# Patient Record
Sex: FEMALE | Race: WHITE | NOT HISPANIC OR LATINO | Employment: UNEMPLOYED | URBAN - NONMETROPOLITAN AREA
[De-identification: names, ages, dates, MRNs, and addresses within clinical notes are randomized per-mention and may not be internally consistent; named-entity substitution may affect disease eponyms.]

---

## 2021-04-27 ENCOUNTER — OFFICE VISIT (OUTPATIENT)
Dept: CARDIOLOGY | Facility: CLINIC | Age: 61
End: 2021-04-27

## 2021-04-27 VITALS
WEIGHT: 241 LBS | DIASTOLIC BLOOD PRESSURE: 90 MMHG | BODY MASS INDEX: 37.83 KG/M2 | TEMPERATURE: 98.2 F | OXYGEN SATURATION: 96 % | HEART RATE: 71 BPM | HEIGHT: 67 IN | SYSTOLIC BLOOD PRESSURE: 154 MMHG

## 2021-04-27 DIAGNOSIS — I95.89 OTHER SPECIFIED HYPOTENSION: ICD-10-CM

## 2021-04-27 DIAGNOSIS — R53.82 CHRONIC FATIGUE: ICD-10-CM

## 2021-04-27 DIAGNOSIS — E88.81 METABOLIC SYNDROME: ICD-10-CM

## 2021-04-27 DIAGNOSIS — M79.10 MYALGIA: ICD-10-CM

## 2021-04-27 DIAGNOSIS — R94.31 ABNORMAL EKG: ICD-10-CM

## 2021-04-27 DIAGNOSIS — R00.2 PALPITATIONS: ICD-10-CM

## 2021-04-27 DIAGNOSIS — R06.02 SHORTNESS OF BREATH: ICD-10-CM

## 2021-04-27 DIAGNOSIS — E55.9 VITAMIN D DEFICIENCY: ICD-10-CM

## 2021-04-27 DIAGNOSIS — U07.1 COVID-19 VIRUS INFECTION: Primary | ICD-10-CM

## 2021-04-27 DIAGNOSIS — E78.00 HYPERCHOLESTEREMIA: ICD-10-CM

## 2021-04-27 DIAGNOSIS — I25.6 SILENT MYOCARDIAL ISCHEMIA: ICD-10-CM

## 2021-04-27 PROBLEM — I95.9 ARTERIAL HYPOTENSION: Status: ACTIVE | Noted: 2021-04-27

## 2021-04-27 PROBLEM — E88.810 METABOLIC SYNDROME: Status: ACTIVE | Noted: 2021-04-27

## 2021-04-27 PROCEDURE — 99204 OFFICE O/P NEW MOD 45 MIN: CPT | Performed by: INTERNAL MEDICINE

## 2021-04-27 PROCEDURE — 93000 ELECTROCARDIOGRAM COMPLETE: CPT | Performed by: INTERNAL MEDICINE

## 2021-04-27 RX ORDER — ALBUTEROL SULFATE 90 UG/1
2 AEROSOL, METERED RESPIRATORY (INHALATION) EVERY 4 HOURS PRN
COMMUNITY

## 2021-04-27 RX ORDER — MIDODRINE HYDROCHLORIDE 10 MG/1
10 TABLET ORAL
COMMUNITY
End: 2021-04-27

## 2021-04-27 RX ORDER — BENZONATATE 100 MG/1
100 CAPSULE ORAL 3 TIMES DAILY PRN
COMMUNITY
End: 2021-07-27

## 2021-04-27 RX ORDER — ASPIRIN 81 MG/1
81 TABLET ORAL DAILY
COMMUNITY

## 2021-04-27 RX ORDER — LANOLIN ALCOHOL/MO/W.PET/CERES
400 CREAM (GRAM) TOPICAL DAILY
COMMUNITY

## 2021-04-27 RX ORDER — LORATADINE 10 MG/1
10 TABLET ORAL DAILY
COMMUNITY

## 2021-04-27 RX ORDER — ATORVASTATIN CALCIUM 80 MG/1
40 TABLET, FILM COATED ORAL DAILY
COMMUNITY
End: 2022-09-20 | Stop reason: SDUPTHER

## 2021-04-27 RX ORDER — MIDODRINE HYDROCHLORIDE 10 MG/1
10 TABLET ORAL
Status: SHIPPED | COMMUNITY
Start: 2021-04-27 | End: 2021-05-04 | Stop reason: DRUGHIGH

## 2021-04-27 NOTE — PATIENT INSTRUCTIONS
Mediterranean Diet  A Mediterranean diet refers to food and lifestyle choices that are based on the traditions of countries located on the [place]. This way of eating has been shown to help prevent certain conditions and improve outcomes for people who have chronic diseases, like kidney disease and heart disease.  What are tips for following this plan?  Lifestyle  · Cook and eat meals together with your family, when possible.  · Drink enough fluid to keep your urine clear or pale yellow.  · Be physically active every day. This includes:  ? Aerobic exercise like running or swimming.  ? Leisure activities like gardening, walking, or housework.  · Get 7-8 hours of sleep each night.  · If recommended by your health care provider, drink red wine in moderation. This means 1 glass a day for nonpregnant women and 2 glasses a day for men. A glass of wine equals 5 oz (150 mL).  Reading food labels    · Check the serving size of packaged foods. For foods such as rice and pasta, the serving size refers to the amount of cooked product, not dry.  · Check the total fat in packaged foods. Avoid foods that have saturated fat or trans fats.  · Check the ingredients list for added sugars, such as corn syrup.  Shopping  · At the grocery store, buy most of your food from the areas near the walls of the store. This includes:  ? Fresh fruits and vegetables (produce).  ? Grains, beans, nuts, and seeds. Some of these may be available in unpackaged forms or large amounts (in bulk).  ? Fresh seafood.  ? Poultry and eggs.  ? Low-fat dairy products.  · Buy whole ingredients instead of prepackaged foods.  · Buy fresh fruits and vegetables in-season from local farmers markets.  · Buy frozen fruits and vegetables in resealable bags.  · If you do not have access to quality fresh seafood, buy precooked frozen shrimp or canned fish, such as tuna, salmon, or sardines.  · Buy small amounts of raw or cooked vegetables, salads, or olives from  the deli or salad bar at your store.  · Stock your pantry so you always have certain foods on hand, such as olive oil, canned tuna, canned tomatoes, rice, pasta, and beans.  Cooking  · Cook foods with extra-virgin olive oil instead of using butter or other vegetable oils.  · Have meat as a side dish, and have vegetables or grains as your main dish. This means having meat in small portions or adding small amounts of meat to foods like pasta or stew.  · Use beans or vegetables instead of meat in common dishes like chili or lasagna.  · [place] with different cooking methods. Try roasting or broiling vegetables instead of steaming or sautéeing them.  · Add frozen vegetables to soups, stews, pasta, or rice.  · Add nuts or seeds for added healthy fat at each meal. You can add these to yogurt, salads, or vegetable dishes.  · Marinate fish or vegetables using olive oil, lemon juice, garlic, and fresh herbs.  Meal planning    · Plan to eat 1 vegetarian meal one day each week. Try to work up to 2 vegetarian meals, if possible.  · Eat seafood 2 or more times a week.  · Have healthy snacks readily available, such as:  ? Vegetable sticks with hummus.  ? Greek yogurt.  ? Fruit and nut trail mix.  · Eat balanced meals throughout the week. This includes:  ? Fruit: 2-3 servings a day  ? Vegetables: 4-5 servings a day  ? Low-fat dairy: 2 servings a day  ? Fish, poultry, or lean meat: 1 serving a day  ? Beans and legumes: 2 or more servings a week  ? Nuts and seeds: 1-2 servings a day  ? Whole grains: 6-8 servings a day  ? Extra-virgin olive oil: 3-4 servings a day  · Limit red meat and sweets to only a few servings a month  What are my food choices?  · Mediterranean diet  ? Recommended  § Grains: Whole-grain pasta. Brown rice. Bulgar wheat. Polenta. Couscous. Whole-wheat bread. Oatmeal. Quinoa.  § Vegetables: Artichokes. Beets. Broccoli. Cabbage. Carrots. Eggplant. Green beans. Chard. Kale. Spinach. Onions. Leeks. Peas. Squash.  Tomatoes. Peppers. Radishes.  § Fruits: Apples. Apricots. Avocado. Berries. Bananas. Cherries. Dates. Figs. Grapes. Judith. Melon. Oranges. Peaches. Plums. Pomegranate.  § Meats and other protein foods: Beans. Almonds. Sunflower seeds. Pine nuts. Peanuts. Cod. [place]. Scallops. Shrimp. Tuna. Tilapia. Clams. Oysters. Eggs.  § Dairy: Low-fat milk. Cheese. Greek yogurt.  § Beverages: Water. Red wine. Herbal tea.  § Fats and oils: Extra virgin olive oil. Avocado oil. Grape seed oil.  § Sweets and desserts: Greek yogurt with honey. Baked apples. Poached pears. Trail mix.  § Seasoning and other foods: Basil. Cilantro. Coriander. Cumin. Mint. Parsley. Geoff. Rosemary. Tarragon. Garlic. Oregano. Thyme. Pepper. Balsalmic vinegar. Tahini. Hummus. Tomato sauce. Olives. Mushrooms.  ? Limit these  § Grains: Prepackaged pasta or rice dishes. Prepackaged cereal with added sugar.  § Vegetables: Deep fried potatoes (french fries).  § Fruits: Fruit canned in syrup.  § Meats and other protein foods: Beef. Pork. Lamb. Poultry with skin. Hot dogs. [place]gu[place].  § Dairy: Ice cream. Sour cream. Whole milk.  § Beverages: Juice. Sugar-sweetened soft drinks. Beer. Liquor and spirits.  § Fats and oils: Butter. Canola oil. Vegetable oil. Beef fat (tallow). Lard.  § Sweets and desserts: Cookies. Cakes. Pies. Candy.  § Seasoning and other foods: Mayonnaise. Premade sauces and marinades.  The items listed may not be a complete list. Talk with your dietitian about what dietary choices are right for you.  Summary  · The Mediterranean diet includes both food and lifestyle choices.  · Eat a variety of fresh fruits and vegetables, beans, nuts, seeds, and whole grains.  · Limit the amount of red meat and sweets that you eat.  · Talk with your health care provider about whether it is safe for you to drink red wine in moderation. This means 1 glass a day for nonpregnant women and 2 glasses a day for men. A glass of wine equals 5 oz (150 mL).  This information  is not intended to replace advice given to you by your health care provider. Make sure you discuss any questions you have with your health care provider.  Document Revised: 08/17/2017 Document Reviewed: 08/10/2017  Elsevier Patient Education © 2020 [employer]

## 2021-04-27 NOTE — PROGRESS NOTES
Chief Complaint   Patient presents with   • Establish Care     PCP referred, cardiac workup post Covid   • Covid     diagnosed in January, admitted to [facility] in [place], transfered to [facility] in [place], respiratory failure,intubated twice etc.  Transfered back to [facility] for rehab.  Requesting records. NSTEMI listed on her discharge papers.    • Shortness of Breath     with exertion, currently wearing O2 24/7 post Covid, Dr Stock following.    • Palpitations     rarely will have a palpitation   • Hypotension     was discharged on Midodrine, had never had low BP before.        CARDIAC COMPLAINTS  dyspnea and fatigue      Subjective   Yulissa Couch is a 60 y.o. female came in today for initial cardiac evaluation.  She had no previously diagnosed cardiac or other medical history to January of this year.  She was diagnosed with COVID-19 infection and within couple of days she started having more shortness of breath got admitted to the hospital at [place].  She had significant desaturation and was transferred to [facility] where she was intubated.  She was on the ventilator for a prolonged period of time she was extubated and got re intubated again because of the desaturation.  She was treated with remdesivir, Decadron.  She also received convalescent plasma and later was treated with antibiotic in the form of azithromycin and vancomycin.  During her stay she also had mild elevation of the troponin suggestive of possible non-STEMI.  Her echocardiogram was reported as normal EF though it was technically limited.  Since she was discharged she has been having problem with hypotension.  She was started on ProAmatine and she is on 10 mg 3 times a day.  She also takes a very high dose of Lipitor.  She now has shortness of breath on any exertion.  She is using oxygen 24/7 and is now being followed by the pulmonologist she has some occasional palpitation.  She does feel tired and fatigue and unable to do much  activities.  She was in the rehab for few weeks.  She is trying to exercise on her own.  She apparently had labs done at your office recently.  She used to smoke in the past but did quit in .  Her father  with an MI and her mother  with stroke    Past Surgical History:   Procedure Laterality Date   • ECHO - CONVERTED  2021    @ [facility]- TLS. EF 67%. Mild MR   • ECHO - CONVERTED  2021    @ [facility]. TLS. EF 66^. Trace MR       Current Outpatient Medications   Medication Sig Dispense Refill   • albuterol sulfate  (90 Base) MCG/ACT inhaler Inhale 2 puffs Every 4 (Four) Hours As Needed for Wheezing.     • aspirin 81 MG EC tablet Take 81 mg by mouth Daily.     • atorvastatin (LIPITOR) 80 MG tablet Take 80 mg by mouth Daily.     • benzonatate (TESSALON) 100 MG capsule Take 100 mg by mouth 3 (Three) Times a Day As Needed for Cough.     • folic acid (FOLVITE) 400 MCG tablet Take 400 mcg by mouth Daily.     • loratadine (CLARITIN) 10 MG tablet Take 10 mg by mouth Daily.     • midodrine (PROAMATINE) 10 MG tablet Take 1 tablet by mouth 3 (Three) Times a Day Before Meals. Reduce to 1/2 tab TID     • O2 (OXYGEN) Inhale 2 L/min 1 (One) Time.     • vitamin B-12 (CYANOCOBALAMIN) 250 MCG tablet Take 250 mcg by mouth Daily.       No current facility-administered medications for this visit.           ALLERGIES:  Morphine, Penicillins, and Benadryl [diphenhydramine]    Past Medical History:   Diagnosis Date   • Anemia    • Asthma    • History of hysterectomy    • Hyperlipidemia    • Hypotension    • Seasonal allergies        Social History     Tobacco Use   Smoking Status Former Smoker   • Packs/day: 1.00   • Years: 30.00   • Pack years: 30.00   • Types: Cigarettes   • Quit date:    • Years since quitting: 10.3   Smokeless Tobacco Never Used          Family History   Problem Relation Age of Onset   • Stroke Mother          at 49 with stroke   • Heart attack Father          at 52 with MI   • Stroke  "Maternal Grandfather    • Other Other         5 siblings, all have HTN   • No Known Problems Son    • No Known Problems Son    • Diabetes Son    • Hypertension Son        Review of Systems   Constitutional: Positive for malaise/fatigue and weight gain. Negative for decreased appetite.   HENT: Negative for congestion and sore throat.    Eyes: Negative for blurred vision.   Cardiovascular: Positive for dyspnea on exertion. Negative for chest pain.   Respiratory: Positive for shortness of breath. Negative for snoring.    Endocrine: Negative for cold intolerance and heat intolerance.   Hematologic/Lymphatic: Negative for adenopathy. Does not bruise/bleed easily.   Skin: Negative for itching, nail changes and skin cancer.   Musculoskeletal: Negative for arthritis and myalgias.   Gastrointestinal: Negative for abdominal pain, dysphagia and heartburn.   Genitourinary: Negative for bladder incontinence and frequency.   Neurological: Negative for dizziness, light-headedness, seizures and vertigo.   Psychiatric/Behavioral: Negative for altered mental status.   Allergic/Immunologic: Negative for environmental allergies and hives.       Diabetes- No  Thyroid- normal    Objective     /90 (BP Location: Left arm)   Pulse 71   Temp 98.2 °F (36.8 °C)   Ht 170.2 cm (67\")   Wt 109 kg (241 lb)   SpO2 96%   BMI 37.75 kg/m²     Vitals and nursing note reviewed.   Constitutional:       Appearance: Healthy appearance. Not in distress.   Eyes:      Conjunctiva/sclera: Conjunctivae normal.      Pupils: Pupils are equal, round, and reactive to light.   HENT:      Head: Normocephalic.   Pulmonary:      Effort: Pulmonary effort is normal.      Breath sounds: Normal breath sounds.   Cardiovascular:      Normal rate. Regular rhythm.      Murmurs: There is a systolic murmur.   Edema:     Ankle: bilateral 1+ edema of the ankle.     Feet: bilateral 1+ edema of the feet.  Abdominal:      General: Bowel sounds are normal.      Palpations: " Abdomen is soft.   Musculoskeletal: Normal range of motion.      Cervical back: Normal range of motion and neck supple. Skin:     General: Skin is warm and dry.   Neurological:      Mental Status: Alert, oriented to person, place, and time and oriented to person, place and time.           ECG 12 Lead    Date/Time: 4/27/2021 4:28 PM  Performed by: Darvin Cantu MD  Authorized by: Darvin Cantu MD   Comparison: compared with previous ECG from 2/2/2021  Comparison to previous ECG: New Q Wawe  Rhythm: sinus rhythm  Rate: normal  Q waves: V3 and V4    QRS axis: normal    Clinical impression: abnormal EKG              Assessment/Plan   Patient's Body mass index is 37.75 kg/m². BMI is above normal parameters. Recommendations include: educational material, exercise counseling and nutrition counseling.     Diagnoses and all orders for this visit:    1. COVID-19 virus infection (Primary)  -     Adult Transthoracic Echo Complete W/ Cont if Necessary Per Protocol; Future  -     SARS-CoV-2 Antibodies (Roche); Future    2. Other specified hypotension  -     CBC & Differential; Future    3. Metabolic syndrome  -     Comprehensive Metabolic Panel; Future    4. Shortness of breath  -     Adult Transthoracic Echo Complete W/ Cont if Necessary Per Protocol; Future    5. Palpitations    6. Hypercholesteremia  -     Lipid Panel; Future    7. Myalgia  -     CK; Future    8. Abnormal EKG  -     Stress Test With Myocardial Perfusion One Day; Future    9. Silent myocardial ischemia  -     Stress Test With Myocardial Perfusion One Day; Future    10. Vitamin D deficiency  -     Vitamin D 25 Hydroxy; Future    11. Chronic fatigue  -     Folate; Future  -     Vitamin B12; Future       At baseline her heart rate is normal.  Her blood pressure is mildly elevated.  Her EKG showed normal sinus rhythm with Q waves in the anterior leads.  Her clinical examination reveals a BMI of 38.  She does have short systolic murmur and she has  trace pedal edema.  She is on 2 L oxygen and her O2 sat was 96%    Her main problems is the shortness of breath which occurs on exertion.  Her CT scan does show persistent infiltrate.  Some of the problems could be related to her lungs itself.  I did schedule her to undergo an echocardiogram to reevaluate the LV function and also to evaluate the PA pressure.  She also wants to know whether she needs to take a Covid vaccine.  I advised her to check the antibodies and if the antibodies are elevated then I do not think she needs it now    Regarding her mild elevation of cardiac enzymes when she was in the hospital, it could be related to her hypoxia and demand ischemia.  Since his shortness of breath is still persisting, I schedule her to undergo a Lexiscan to rule out silent ischemia    Regarding the hypercholesterolemia, I do not have the lipids value.  She is now having a lot of myalgia.  I advised her to recheck her lipids along with it CK level.  If the lipase is completely normal then we can reduce the dose of Lipitor to 10 mg    She also seems to be having some low vitamin.  I advised her to check her B12, folic acid and vitamin D level    She has gained over 15 pounds, part of them could be related to the steroid use and also not exercising.  I talked to her about diet and weight reduction.  I gave her papers on Mediterranean diet    Regarding the hypotension, at this time we can cut down the dose from 10 mg to 5 mg.  Recheck her blood pressure in 1 week if it is normal then we can cut it down to 2.53 times a day and slowly try to taper her off ProAmatine.    Based on your response, further recommendations will be made.           Electronically signed by Darvin Cantu MD April 27, 2021 16:17 EDT

## 2021-04-29 ENCOUNTER — LAB (OUTPATIENT)
Dept: LAB | Facility: HOSPITAL | Age: 61
End: 2021-04-29

## 2021-04-29 DIAGNOSIS — I95.89 OTHER SPECIFIED HYPOTENSION: ICD-10-CM

## 2021-04-29 DIAGNOSIS — M79.10 MYALGIA: ICD-10-CM

## 2021-04-29 DIAGNOSIS — E88.81 METABOLIC SYNDROME: ICD-10-CM

## 2021-04-29 DIAGNOSIS — R53.82 CHRONIC FATIGUE: ICD-10-CM

## 2021-04-29 DIAGNOSIS — E55.9 VITAMIN D DEFICIENCY: ICD-10-CM

## 2021-04-29 DIAGNOSIS — E78.00 HYPERCHOLESTEREMIA: ICD-10-CM

## 2021-04-29 LAB
ALBUMIN SERPL-MCNC: 3.94 G/DL (ref 3.5–5.2)
ALBUMIN/GLOB SERPL: 1.2 G/DL
ALP SERPL-CCNC: 97 U/L (ref 39–117)
ALT SERPL W P-5'-P-CCNC: 18 U/L (ref 1–33)
ANION GAP SERPL CALCULATED.3IONS-SCNC: 11.9 MMOL/L (ref 5–15)
AST SERPL-CCNC: 20 U/L (ref 1–32)
BASOPHILS # BLD AUTO: 0.04 10*3/MM3 (ref 0–0.2)
BASOPHILS NFR BLD AUTO: 0.8 % (ref 0–1.5)
BILIRUB SERPL-MCNC: 0.3 MG/DL (ref 0–1.2)
BUN SERPL-MCNC: 10 MG/DL (ref 8–23)
BUN/CREAT SERPL: 13.3 (ref 7–25)
CALCIUM SPEC-SCNC: 9.6 MG/DL (ref 8.6–10.5)
CHLORIDE SERPL-SCNC: 107 MMOL/L (ref 98–107)
CHOLEST SERPL-MCNC: 176 MG/DL (ref 0–200)
CK SERPL-CCNC: 26 U/L (ref 20–180)
CO2 SERPL-SCNC: 23.1 MMOL/L (ref 22–29)
CREAT SERPL-MCNC: 0.75 MG/DL (ref 0.57–1)
DEPRECATED RDW RBC AUTO: 47.7 FL (ref 37–54)
EOSINOPHIL # BLD AUTO: 0.31 10*3/MM3 (ref 0–0.4)
EOSINOPHIL NFR BLD AUTO: 5.9 % (ref 0.3–6.2)
ERYTHROCYTE [DISTWIDTH] IN BLOOD BY AUTOMATED COUNT: 13.4 % (ref 12.3–15.4)
GFR SERPL CREATININE-BSD FRML MDRD: 78 ML/MIN/1.73
GLOBULIN UR ELPH-MCNC: 3.2 GM/DL
GLUCOSE SERPL-MCNC: 94 MG/DL (ref 65–99)
HCT VFR BLD AUTO: 37 % (ref 34–46.6)
HDLC SERPL-MCNC: 49 MG/DL (ref 40–60)
HGB BLD-MCNC: 10.9 G/DL (ref 12–15.9)
IMM GRANULOCYTES # BLD AUTO: 0.02 10*3/MM3 (ref 0–0.05)
IMM GRANULOCYTES NFR BLD AUTO: 0.4 % (ref 0–0.5)
LDLC SERPL CALC-MCNC: 93 MG/DL (ref 0–100)
LDLC/HDLC SERPL: 1.78 {RATIO}
LYMPHOCYTES # BLD AUTO: 1.77 10*3/MM3 (ref 0.7–3.1)
LYMPHOCYTES NFR BLD AUTO: 33.5 % (ref 19.6–45.3)
MCH RBC QN AUTO: 28.2 PG (ref 26.6–33)
MCHC RBC AUTO-ENTMCNC: 29.5 G/DL (ref 31.5–35.7)
MCV RBC AUTO: 95.9 FL (ref 79–97)
MONOCYTES # BLD AUTO: 0.5 10*3/MM3 (ref 0.1–0.9)
MONOCYTES NFR BLD AUTO: 9.5 % (ref 5–12)
NEUTROPHILS NFR BLD AUTO: 2.64 10*3/MM3 (ref 1.7–7)
NEUTROPHILS NFR BLD AUTO: 49.9 % (ref 42.7–76)
NRBC BLD AUTO-RTO: 0 /100 WBC (ref 0–0.2)
PLATELET # BLD AUTO: 230 10*3/MM3 (ref 140–450)
PMV BLD AUTO: 10.9 FL (ref 6–12)
POTASSIUM SERPL-SCNC: 4.5 MMOL/L (ref 3.5–5.2)
PROT SERPL-MCNC: 7.1 G/DL (ref 6–8.5)
RBC # BLD AUTO: 3.86 10*6/MM3 (ref 3.77–5.28)
SODIUM SERPL-SCNC: 142 MMOL/L (ref 136–145)
TRIGL SERPL-MCNC: 200 MG/DL (ref 0–150)
VLDLC SERPL-MCNC: 34 MG/DL (ref 5–40)
WBC # BLD AUTO: 5.28 10*3/MM3 (ref 3.4–10.8)

## 2021-04-29 PROCEDURE — 86769 SARS-COV-2 COVID-19 ANTIBODY: CPT | Performed by: INTERNAL MEDICINE

## 2021-04-29 PROCEDURE — 82306 VITAMIN D 25 HYDROXY: CPT

## 2021-04-29 PROCEDURE — 82746 ASSAY OF FOLIC ACID SERUM: CPT

## 2021-04-29 PROCEDURE — 80053 COMPREHEN METABOLIC PANEL: CPT

## 2021-04-29 PROCEDURE — 80061 LIPID PANEL: CPT

## 2021-04-29 PROCEDURE — 82550 ASSAY OF CK (CPK): CPT

## 2021-04-29 PROCEDURE — 85025 COMPLETE CBC W/AUTO DIFF WBC: CPT

## 2021-04-29 PROCEDURE — 82607 VITAMIN B-12: CPT

## 2021-04-30 LAB
25(OH)D3+25(OH)D2 SERPL-MCNC: 8.1 NG/ML (ref 30–100)
FOLATE SERPL-MCNC: >20 NG/ML
VIT B12 SERPL-MCNC: 458 PG/ML (ref 232–1245)

## 2021-05-04 ENCOUNTER — CLINICAL SUPPORT (OUTPATIENT)
Dept: CARDIOLOGY | Facility: CLINIC | Age: 61
End: 2021-05-04

## 2021-05-04 VITALS — SYSTOLIC BLOOD PRESSURE: 132 MMHG | DIASTOLIC BLOOD PRESSURE: 74 MMHG | HEART RATE: 64 BPM

## 2021-05-04 DIAGNOSIS — Z01.30 BLOOD PRESSURE CHECK: Primary | ICD-10-CM

## 2021-05-04 RX ORDER — ERGOCALCIFEROL 1.25 MG/1
50000 CAPSULE ORAL WEEKLY
Qty: 5 CAPSULE | Refills: 11 | Status: SHIPPED | OUTPATIENT
Start: 2021-05-04 | End: 2022-05-29 | Stop reason: SDUPTHER

## 2021-05-04 RX ORDER — MIDODRINE HYDROCHLORIDE 2.5 MG/1
2.5 TABLET ORAL
Qty: 90 TABLET | Refills: 5 | Status: SHIPPED | OUTPATIENT
Start: 2021-05-04 | End: 2022-02-09

## 2021-05-04 NOTE — TELEPHONE ENCOUNTER
Patient was made aware to decrease midodrine to 2.5 mg TID. Script pended to be sent to [facility] in [place].

## 2021-05-04 NOTE — TELEPHONE ENCOUNTER
Patient came by office this morning for BP check after decreasing midodrine to 5 mg TID. Do you want to decrease dose?    BP - 132/74 right arm  HR - 64

## 2021-05-24 ENCOUNTER — HOSPITAL ENCOUNTER (OUTPATIENT)
Dept: CARDIOLOGY | Facility: HOSPITAL | Age: 61
Discharge: HOME | End: 2021-05-24

## 2021-05-24 DIAGNOSIS — R06.02 SHORTNESS OF BREATH: ICD-10-CM

## 2021-05-24 DIAGNOSIS — I25.6 SILENT MYOCARDIAL ISCHEMIA: ICD-10-CM

## 2021-05-24 DIAGNOSIS — U07.1 COVID-19 VIRUS INFECTION: ICD-10-CM

## 2021-05-24 DIAGNOSIS — R94.31 ABNORMAL EKG: ICD-10-CM

## 2021-05-24 LAB
BH CV ECHO MEAS - ACS: 1.6 CM
BH CV ECHO MEAS - AO MAX PG (FULL): 3.3 MMHG
BH CV ECHO MEAS - AO MAX PG: 8.2 MMHG
BH CV ECHO MEAS - AO MEAN PG (FULL): 1.9 MMHG
BH CV ECHO MEAS - AO MEAN PG: 5 MMHG
BH CV ECHO MEAS - AO ROOT AREA (BSA CORRECTED): 1.3
BH CV ECHO MEAS - AO ROOT AREA: 6.4 CM^2
BH CV ECHO MEAS - AO ROOT DIAM: 2.9 CM
BH CV ECHO MEAS - AO V2 MAX: 143 CM/SEC
BH CV ECHO MEAS - AO V2 MEAN: 108.5 CM/SEC
BH CV ECHO MEAS - AO V2 VTI: 32.7 CM
BH CV ECHO MEAS - AVA(I,A): 2.6 CM^2
BH CV ECHO MEAS - AVA(I,D): 2.6 CM^2
BH CV ECHO MEAS - AVA(V,A): 2.5 CM^2
BH CV ECHO MEAS - AVA(V,D): 2.5 CM^2
BH CV ECHO MEAS - BSA(HAYCOCK): 2.3 M^2
BH CV ECHO MEAS - BSA: 2.2 M^2
BH CV ECHO MEAS - BZI_BMI: 37.7 KILOGRAMS/M^2
BH CV ECHO MEAS - BZI_METRIC_HEIGHT: 170 CM
BH CV ECHO MEAS - BZI_METRIC_WEIGHT: 109 KG
BH CV ECHO MEAS - EDV(CUBED): 119.2 ML
BH CV ECHO MEAS - EDV(MOD-SP2): 42.2 ML
BH CV ECHO MEAS - EDV(MOD-SP4): 69.5 ML
BH CV ECHO MEAS - EDV(TEICH): 114 ML
BH CV ECHO MEAS - EF(CUBED): 74.1 %
BH CV ECHO MEAS - EF(TEICH): 65.7 %
BH CV ECHO MEAS - ESV(CUBED): 30.9 ML
BH CV ECHO MEAS - ESV(TEICH): 39.1 ML
BH CV ECHO MEAS - FS: 36.2 %
BH CV ECHO MEAS - IVS/LVPW: 1.1
BH CV ECHO MEAS - IVSD: 0.98 CM
BH CV ECHO MEAS - LA DIMENSION: 3.1 CM
BH CV ECHO MEAS - LA/AO: 1.1
BH CV ECHO MEAS - LAT PEAK E' VEL: 7.5 CM/SEC
BH CV ECHO MEAS - LV DIASTOLIC VOL/BSA (35-75): 31.8 ML/M^2
BH CV ECHO MEAS - LV IVRT: 0.09 SEC
BH CV ECHO MEAS - LV MASS(C)D: 165.8 GRAMS
BH CV ECHO MEAS - LV MASS(C)DI: 75.9 GRAMS/M^2
BH CV ECHO MEAS - LV MAX PG: 4.9 MMHG
BH CV ECHO MEAS - LV MEAN PG: 3 MMHG
BH CV ECHO MEAS - LV V1 MAX: 111 CM/SEC
BH CV ECHO MEAS - LV V1 MEAN: 85 CM/SEC
BH CV ECHO MEAS - LV V1 VTI: 26.4 CM
BH CV ECHO MEAS - LVIDD: 4.9 CM
BH CV ECHO MEAS - LVIDS: 3.1 CM
BH CV ECHO MEAS - LVOT AREA (M): 3.3 CM^2
BH CV ECHO MEAS - LVOT AREA: 3.3 CM^2
BH CV ECHO MEAS - LVOT DIAM: 2 CM
BH CV ECHO MEAS - LVPWD: 0.92 CM
BH CV ECHO MEAS - MED PEAK E' VEL: 6.5 CM/SEC
BH CV ECHO MEAS - MR MAX PG: 33.1 MMHG
BH CV ECHO MEAS - MR MAX VEL: 284.6 CM/SEC
BH CV ECHO MEAS - MV A MAX VEL: 76.2 CM/SEC
BH CV ECHO MEAS - MV DEC TIME: 0.16 SEC
BH CV ECHO MEAS - MV E MAX VEL: 72.9 CM/SEC
BH CV ECHO MEAS - MV E/A: 0.96
BH CV ECHO MEAS - MV MAX PG: 2.4 MMHG
BH CV ECHO MEAS - MV MEAN PG: 0.91 MMHG
BH CV ECHO MEAS - MV V2 MAX: 76.8 CM/SEC
BH CV ECHO MEAS - MV V2 MEAN: 44 CM/SEC
BH CV ECHO MEAS - MV V2 VTI: 19 CM
BH CV ECHO MEAS - MVA(VTI): 4.6 CM^2
BH CV ECHO MEAS - PA ACC TIME: 0.08 SEC
BH CV ECHO MEAS - PA MAX PG: 4.5 MMHG
BH CV ECHO MEAS - PA MEAN PG: 2.7 MMHG
BH CV ECHO MEAS - PA PR(ACCEL): 44.6 MMHG
BH CV ECHO MEAS - PA V2 MAX: 106 CM/SEC
BH CV ECHO MEAS - PA V2 MEAN: 79.7 CM/SEC
BH CV ECHO MEAS - PA V2 VTI: 22.2 CM
BH CV ECHO MEAS - PI END-D VEL: 92.9 CM/SEC
BH CV ECHO MEAS - RAP SYSTOLE: 10 MMHG
BH CV ECHO MEAS - RVDD: 2.8 CM
BH CV ECHO MEAS - RVSP: 19 MMHG
BH CV ECHO MEAS - SI(AO): 95.9 ML/M^2
BH CV ECHO MEAS - SI(CUBED): 40.4 ML/M^2
BH CV ECHO MEAS - SI(LVOT): 39.7 ML/M^2
BH CV ECHO MEAS - SI(TEICH): 34.3 ML/M^2
BH CV ECHO MEAS - SV(AO): 209.5 ML
BH CV ECHO MEAS - SV(CUBED): 88.3 ML
BH CV ECHO MEAS - SV(LVOT): 86.6 ML
BH CV ECHO MEAS - SV(TEICH): 74.9 ML
BH CV ECHO MEAS - TR MAX VEL: 146.6 CM/SEC
BH CV ECHO MEASUREMENTS AVERAGE E/E' RATIO: 10.41
BH CV NUCLEAR PRIOR STUDY: 3
BH CV REST NUCLEAR ISOTOPE DOSE: 10 MCI
BH CV STRESS DURATION MIN STAGE 1: 3
BH CV STRESS DURATION SEC STAGE 1: 0
BH CV STRESS GRADE STAGE 1: 10
BH CV STRESS METS STAGE 1: 5
BH CV STRESS NUCLEAR ISOTOPE DOSE: 30 MCI
BH CV STRESS PROTOCOL 1: NORMAL
BH CV STRESS RECOVERY BP: NORMAL MMHG
BH CV STRESS RECOVERY HR: 75 BPM
BH CV STRESS SPEED STAGE 1: 1.7
BH CV STRESS STAGE 1: 1
LV EF NUC BP: 67 %
MAXIMAL PREDICTED HEART RATE: 158 BPM
MAXIMAL PREDICTED HEART RATE: 158 BPM
PERCENT MAX PREDICTED HR: 60.76 %
STRESS BASELINE BP: NORMAL MMHG
STRESS BASELINE HR: 68 BPM
STRESS PERCENT HR: 71 %
STRESS POST PEAK BP: NORMAL MMHG
STRESS POST PEAK HR: 96 BPM
STRESS TARGET HR: 134 BPM
STRESS TARGET HR: 134 BPM

## 2021-05-24 PROCEDURE — 78452 HT MUSCLE IMAGE SPECT MULT: CPT

## 2021-05-24 PROCEDURE — 93017 CV STRESS TEST TRACING ONLY: CPT

## 2021-05-24 PROCEDURE — 0 TECHNETIUM SESTAMIBI: Performed by: INTERNAL MEDICINE

## 2021-05-24 PROCEDURE — 93018 CV STRESS TEST I&R ONLY: CPT | Performed by: INTERNAL MEDICINE

## 2021-05-24 PROCEDURE — 93306 TTE W/DOPPLER COMPLETE: CPT

## 2021-05-24 PROCEDURE — 78452 HT MUSCLE IMAGE SPECT MULT: CPT | Performed by: INTERNAL MEDICINE

## 2021-05-24 PROCEDURE — A9500 TC99M SESTAMIBI: HCPCS | Performed by: INTERNAL MEDICINE

## 2021-05-24 PROCEDURE — 25010000002 REGADENOSON 0.4 MG/5ML SOLUTION: Performed by: INTERNAL MEDICINE

## 2021-05-24 PROCEDURE — 93306 TTE W/DOPPLER COMPLETE: CPT | Performed by: INTERNAL MEDICINE

## 2021-05-24 RX ADMIN — TECHNETIUM TC 99M SESTAMIBI 1 DOSE: 1 INJECTION INTRAVENOUS at 11:30

## 2021-05-24 RX ADMIN — REGADENOSON 0.4 MG: 0.08 INJECTION, SOLUTION INTRAVENOUS at 13:41

## 2021-05-24 RX ADMIN — TECHNETIUM TC 99M SESTAMIBI 1 DOSE: 1 INJECTION INTRAVENOUS at 13:41

## 2021-07-27 ENCOUNTER — OFFICE VISIT (OUTPATIENT)
Dept: CARDIOLOGY | Facility: CLINIC | Age: 61
End: 2021-07-27

## 2021-07-27 VITALS
HEIGHT: 67 IN | DIASTOLIC BLOOD PRESSURE: 70 MMHG | BODY MASS INDEX: 39.08 KG/M2 | WEIGHT: 249 LBS | HEART RATE: 64 BPM | SYSTOLIC BLOOD PRESSURE: 126 MMHG

## 2021-07-27 DIAGNOSIS — E88.81 METABOLIC SYNDROME: ICD-10-CM

## 2021-07-27 DIAGNOSIS — R06.02 SHORTNESS OF BREATH: Primary | ICD-10-CM

## 2021-07-27 DIAGNOSIS — G47.33 OSA TREATED WITH BIPAP: ICD-10-CM

## 2021-07-27 DIAGNOSIS — E55.9 VITAMIN D DEFICIENCY: ICD-10-CM

## 2021-07-27 DIAGNOSIS — R53.82 CHRONIC FATIGUE: ICD-10-CM

## 2021-07-27 DIAGNOSIS — R60.1 GENERALIZED EDEMA: ICD-10-CM

## 2021-07-27 PROCEDURE — 99214 OFFICE O/P EST MOD 30 MIN: CPT | Performed by: NURSE PRACTITIONER

## 2021-07-27 RX ORDER — MINOXIDIL 2.5 MG/1
2.5 TABLET ORAL 3 TIMES DAILY
COMMUNITY
End: 2022-09-20

## 2021-07-27 RX ORDER — FUROSEMIDE 20 MG/1
TABLET ORAL
Qty: 30 TABLET | Refills: 3 | Status: SHIPPED | OUTPATIENT
Start: 2021-07-27 | End: 2022-09-20

## 2021-07-27 NOTE — PATIENT INSTRUCTIONS
Calorie Counting for Weight Loss  Calories are units of energy. Your body needs a certain number of calories from food to keep going throughout the day. When you eat or drink more calories than your body needs, your body stores the extra calories mostly as fat. When you eat or drink fewer calories than your body needs, your body burns fat to get the energy it needs.  Calorie counting means keeping track of how many calories you eat and drink each day. Calorie counting can be helpful if you need to lose weight. If you eat fewer calories than your body needs, you should lose weight. Ask your health care provider what a healthy weight is for you.  For calorie counting to work, you will need to eat the right number of calories each day to lose a healthy amount of weight per week. A dietitian can help you figure out how many calories you need in a day and will suggest ways to reach your calorie goal.  · A healthy amount of weight to lose each week is usually 1-2 lb (0.5-0.9 kg). This usually means that your daily calorie intake should be reduced by 500-750 calories.  · Eating 1,200-1,500 calories a day can help most women lose weight.  · Eating 1,500-1,800 calories a day can help most men lose weight.  What do I need to know about calorie counting?  Work with your health care provider or dietitian to determine how many calories you should get each day. To meet your daily calorie goal, you will need to:  · Find out how many calories are in each food that you would like to eat. Try to do this before you eat.  · Decide how much of the food you plan to eat.  · Keep a food log. Do this by writing down what you ate and how many calories it had.  To successfully lose weight, it is important to balance calorie counting with a healthy lifestyle that includes regular activity.  Where do I find calorie information?    The number of calories in a food can be found on a Nutrition Facts label. If a food does not have a Nutrition Facts  label, try to look up the calories online or ask your dietitian for help.  Remember that calories are listed per serving. If you choose to have more than one serving of a food, you will have to multiply the calories per serving by the number of servings you plan to eat. For example, the label on a package of bread might say that a serving size is 1 slice and that there are 90 calories in a serving. If you eat 1 slice, you will have eaten 90 calories. If you eat 2 slices, you will have eaten 180 calories.  How do I keep a food log?  After each time that you eat, record the following in your food log as soon as possible:  · What you ate. Be sure to include toppings, sauces, and other extras on the food.  · How much you ate. This can be measured in cups, ounces, or number of items.  · How many calories were in each food and drink.  · The total number of calories in the food you ate.  Keep your food log near you, such as in a pocket-sized notebook or on an izzy or website on your mobile phone. Some programs will calculate calories for you and show you how many calories you have left to meet your daily goal.  What are some portion-control tips?  · Know how many calories are in a serving. This will help you know how many servings you can have of a certain food.  · Use a measuring cup to measure serving sizes. You could also try weighing out portions on a kitchen scale. With time, you will be able to estimate serving sizes for some foods.  · Take time to put servings of different foods on your favorite plates or in your favorite bowls and cups so you know what a serving looks like.  · Try not to eat straight from a food's packaging, such as from a bag or box. Eating straight from the package makes it hard to see how much you are eating and can lead to overeating. Put the amount you would like to eat in a cup or on a plate to make sure you are eating the right portion.  · Use smaller plates, glasses, and bowls for smaller  portions and to prevent overeating.  · Try not to multitask. For example, avoid watching TV or using your computer while eating. If it is time to eat, sit down at a table and enjoy your food. This will help you recognize when you are full. It will also help you be more mindful of what and how much you are eating.  What are tips for following this plan?  Reading food labels  · Check the calorie count compared with the serving size. The serving size may be smaller than what you are used to eating.  · Check the source of the calories. Try to choose foods that are high in protein, fiber, and vitamins, and low in saturated fat, trans fat, and sodium.  Shopping  · Read nutrition labels while you shop. This will help you make healthy decisions about which foods to buy.  · Pay attention to nutrition labels for low-fat or fat-free foods. These foods sometimes have the same number of calories or more calories than the full-fat versions. They also often have added sugar, starch, or salt to make up for flavor that was removed with the fat.  · Make a grocery list of lower-calorie foods and stick to it.  Cooking  · Try to cook your favorite foods in a healthier way. For example, try baking instead of frying.  · Use low-fat dairy products.  Meal planning  · Use more fruits and vegetables. One-half of your plate should be fruits and vegetables.  · Include lean proteins, such as chicken, turkey, and fish.  Lifestyle  Each week, aim to do one of the following:  · 150 minutes of moderate exercise, such as walking.  · 75 minutes of vigorous exercise, such as running.  General information  · Know how many calories are in the foods you eat most often. This will help you calculate calorie counts faster.  · Find a way of tracking calories that works for you. Get creative. Try different apps or programs if writing down calories does not work for you.  What foods should I eat?    · Eat nutritious foods. It is better to have a nutritious,  high-calorie food, such as an avocado, than a food with few nutrients, such as a bag of potato chips.  · Use your calories on foods and drinks that will fill you up and will not leave you hungry soon after eating.  ? Examples of foods that fill you up are nuts and nut butters, vegetables, lean proteins, and high-fiber foods such as whole grains. High-fiber foods are foods with more than 5 g of fiber per serving.  · Pay attention to calories in drinks. Low-calorie drinks include water and unsweetened drinks.  The items listed above may not be a complete list of foods and beverages you can eat. Contact a dietitian for more information.  What foods should I limit?  Limit foods or drinks that are not good sources of vitamins, minerals, or protein or that are high in unhealthy fats. These include:  · Candy.  · Other sweets.  · Sodas, specialty coffee drinks, alcohol, and juice.  The items listed above may not be a complete list of foods and beverages you should avoid. Contact a dietitian for more information.  How do I count calories when eating out?  · Pay attention to portions. Often, portions are much larger when eating out. Try these tips to keep portions smaller:  ? Consider sharing a meal instead of getting your own.  ? If you get your own meal, eat only half of it. Before you start eating, ask for a container and put half of your meal into it.  ? When available, consider ordering smaller portions from the menu instead of full portions.  · Pay attention to your food and drink choices. Knowing the way food is cooked and what is included with the meal can help you eat fewer calories.  ? If calories are listed on the menu, choose the lower-calorie options.  ? Choose dishes that include vegetables, fruits, whole grains, low-fat dairy products, and lean proteins.  ? Choose items that are boiled, broiled, grilled, or steamed. Avoid items that are buttered, battered, fried, or served with cream sauce. Items labeled as  crispy are usually fried, unless stated otherwise.  ? Choose water, low-fat milk, unsweetened iced tea, or other drinks without added sugar. If you want an alcoholic beverage, choose a lower-calorie option, such as a glass of wine or light beer.  ? Ask for dressings, sauces, and syrups on the side. These are usually high in calories, so you should limit the amount you eat.  ? If you want a salad, choose a garden salad and ask for grilled meats. Avoid extra toppings such as jaffe, cheese, or fried items. Ask for the dressing on the side, or ask for olive oil and vinegar or lemon to use as dressing.  · Estimate how many servings of a food you are given. Knowing serving sizes will help you be aware of how much food you are eating at restaurants.  Where to find more information  · Centers for Disease Control and Prevention: www.cdc.gov  · [place] Department of Agriculture: [employer]  Summary  · Calorie counting means keeping track of how many calories you eat and drink each day. If you eat fewer calories than your body needs, you should lose weight.  · A healthy amount of weight to lose per week is usually 1-2 lb (0.5-0.9 kg). This usually means reducing your daily calorie intake by 500-750 calories.  · The number of calories in a food can be found on a Nutrition Facts label. If a food does not have a Nutrition Facts label, try to look up the calories online or ask your dietitian for help.  · Use smaller plates, glasses, and bowls for smaller portions and to prevent overeating.  · Use your calories on foods and drinks that will fill you up and not leave you hungry shortly after a meal.  This information is not intended to replace advice given to you by your health care provider. Make sure you discuss any questions you have with your health care provider.  Document Revised: 01/28/2021 Document Reviewed: 01/28/2021  Elsevier Patient Education © 2021 [employer]  Physical Activity With Heart Disease  Being active has many  benefits, especially if you have heart disease. Physical activity can help you do more and feel healthier. Start slowly, and increase the amount of time you spend being active. Most adults should aim for physical activity that:  · Makes you breathe harder and raises your heart rate (aerobic activity). Try to get at least 150 minutes of aerobic activity each week. This is about 30 minutes each day, 5 days a week.  · Helps build muscle strength (strengthening activity). Do this at least 2 times a week.  Always talk with your health care provider before starting any new activity program or if you have any changes in your condition.  What are the benefits of physical activity?  When you have heart disease, physical activity can help:  · Lower your blood pressure.  · Lower your cholesterol.  · Control your weight.  · Improve your sleep.  · Help control your blood sugar.  · Improve your heart and lung function.  · Reduce your risk for blood clots (thrombophlebitis).  · Improve your energy level.  · Reduce stress.  What are some types of physical activity I could try?  There are many ways to be active. Talk with your health care provider about what types and intensity of activity is right for you.  Aerobic activity    Aerobic (cardiovascular) activity can be moderate or vigorous intensity, depending on how hard you are working.  Moderate-intensity activity includes:  · Walking.  · Slow bicycling.  · Water aerobics.  · Dancing.  · Light gardening or house work.  Vigorous-intensity activity includes:  · Jogging or running.  · Stair climbing.  · Swimming laps.  · Hiking uphill.  · Heavy gardening, such as digging trenches.    Strengthening activity  Strengthening activities work your muscles to build strength. Some examples include:  · Doing push-ups, sit-ups, or pull-ups.  · Lifting small weights.  · Using resistance bands.    Flexibility  Flexibility activities lengthen your muscles to keep them flexible and less tight and  improve your balance. Some examples include:  · Stretching.  · Yoga.  · [place] chi.  · Ballet barre.    Follow these instructions at home:  How to get started  · Talk with your health care provider about:  ? What types of activities are safe for you.  ? If you should check your pulse or take other precautions during physical activity.  · Get a calendar. Write down a schedule and plan for your new routine.  · Take time to find out what works for you. Consider:  ? Joining a community program, such as a biking group, yoga class, local gym, or swimming pool membership.  ? Be active on your own by downloading free workout applications on a smartphone or other devices, or by purchasing workout DVDs.  · If you have not been active, begin with sessions that last 10-15 minutes. Gradually work up to sessions that last 20-30 minutes, 5 times a week. Follow all of your health care provider's recommendations.  · Be patient with yourself. It takes time to build up strength and lung capacity.  Safety  · Exercise in an indoor, climate-controlled facility, as told by your health care provider. You may need to do this if:  ? There are extreme outdoor conditions, such as heat, humidity, or cold.  ? There is an air pollution advisory. Your local news, board of health, or hospital can provide information on air quality.  · Take extra precautions as told by your health care provider. This may include:  ? Monitoring your heart rate.  ? Avoiding heavy lifting.  ? Understanding how your medicines can affect you during physical activity. Certain medicines may cause heat intolerance or changes in blood sugar.  ? Slowing down to rest when you need to.  ? Keeping nitroglycerin spray and tablets with you at all times if you have angina. Use them as told to prevent and treat symptoms.  · Drink plenty of water before, during, and after physical activity.  · Know what symptoms may be signs of a problem. Stop physical activity right away if you have  any of these symptoms.  Get help right away if you have any of the following during exercise:  · Chest pain, shortness of breath, or feel very tired.  · Pain in the arm, shoulder, neck, or jaw.  · Feel weak, dizzy, or light-headed.  · An irregular heart rate, or your heart rate is greater than 100 beats per minute (bpm) before exercise.  These symptoms may represent a serious problem that is an emergency. Do not wait to see if the symptoms go away. Get medical help right away. Call your local emergency services (911 in the [place] Do not drive yourself to the hospital.   Summary  · Physical activity has many benefits, especially if you have heart disease.  · Before starting an activity program, talk with your health care provider about how often to be active and what type of activity is safe for you.  · Your physical activity plan may include moderate or vigorous aerobic activity, strengthening activities, and flexibility.  · Know what symptoms may be signs of a problem. Stop physical activity right away and call emergency services (911 in the [place] if you have any of these symptoms.  This information is not intended to replace advice given to you by your health care provider. Make sure you discuss any questions you have with your health care provider.  Document Revised: 01/09/2019 Document Reviewed: 01/09/2019  [employer] Patient Education © 2021 [employer]

## 2021-07-27 NOTE — PROGRESS NOTES
Chief Complaint   Patient presents with   • Follow-up     Cardiac managment . Has no cardiac complaints today. Has new diagnosis of sleep apnea   • LABS     Results  from April 2021 on chart   • Med Refill     No refills needed today.       Subjective       Yulissa Couch is a 60 y.o. female seen in April 2021 for initial cardiac evaluation.  No prior cardiac history noted.  After Covid she required intubation.  She was on ventilator for prolonged period of time and after extubation required reintubation due to desaturation.  During her hospital stay she had mild elevation of troponin suggestive of non-STEMI.  Echocardiogram was reported as normal.  After discharge she had problems with hypotension and prescribed ProAmatine.  Shortness of breath persisted and supplemental oxygen 24/7 prescribed, followed by pulmonologist.On 5/24/2021 she underwent Lexiscan stress test and echocardiogram.  No ischemia was noted.  Echocardiogram showed LVEF of 61 to 65% and no significant valvular issues.  RVSP was 19 mmHg.     Today she comes to the office for a follow-up visit.  Since last visit she has been diagnosed with sleep apnea now using BiPAP.  Overall she has been feeling better and resumed driving this past month.  She recently purchased [employer] and has been using low impact aerobic DVD to increase activity.  She admits to following low-carb high-protein diet but has not been able to lose weight.  She has some issue with mild generalized swelling.  No chest pain, palpitations, or increase shortness of breath noted.      Cardiac History:    Past Surgical History:   Procedure Laterality Date   • ECHO - CONVERTED  01/25/2021    @ [facility]- TLS. EF 67%. Mild MR   • ECHO - CONVERTED  01/11/2021    @ [facility]. TLS. EF 66^. Trace MR       Current Outpatient Medications   Medication Sig Dispense Refill   • albuterol sulfate  (90 Base) MCG/ACT inhaler Inhale 2 puffs Every 4 (Four) Hours As Needed for Wheezing.     • aspirin 81 MG EC  tablet Take 81 mg by mouth Daily.     • atorvastatin (LIPITOR) 80 MG tablet Take 40 mg by mouth Daily.     • folic acid (FOLVITE) 400 MCG tablet Take 400 mcg by mouth Daily.     • loratadine (CLARITIN) 10 MG tablet Take 10 mg by mouth Daily.     • midodrine (PROAMATINE) 2.5 MG tablet Take 1 tablet by mouth 3 (Three) Times a Day Before Meals. 90 tablet 5   • minoxidil (LONITEN) 2.5 MG tablet Take 2.5 mg by mouth 3 (Three) Times a Day.     • O2 (OXYGEN) Inhale 2 L/min 1 (One) Time.     • vitamin B-12 (CYANOCOBALAMIN) 250 MCG tablet Take 250 mcg by mouth Daily.     • vitamin D (ERGOCALCIFEROL) 1.25 MG (31548 UT) capsule capsule Take 1 capsule by mouth 1 (One) Time Per Week. 5 capsule 11   • furosemide (LASIX) 20 MG tablet 1/2 to 1 tablet once a day if needed for swelling 30 tablet 3     No current facility-administered medications for this visit.       Morphine, Penicillins, and Benadryl [diphenhydramine]    Past Medical History:   Diagnosis Date   • Anemia    • Asthma    • History of hysterectomy    • Hyperlipidemia    • Hypotension    • Seasonal allergies    • Sleep apnea        Social History     Socioeconomic History   • Marital status:      Spouse name: Not on file   • Number of children: Not on file   • Years of education: Not on file   • Highest education level: Not on file   Tobacco Use   • Smoking status: Former Smoker     Packs/day: 1.00     Years: 30.00     Pack years: 30.00     Types: Cigarettes     Quit date:      Years since quitting: 10.5   • Smokeless tobacco: Never Used   Vaping Use   • Vaping Use: Never used   Substance and Sexual Activity   • Alcohol use: Never   • Drug use: Never       Family History   Problem Relation Age of Onset   • Stroke Mother          at 49 with stroke   • Heart attack Father          at 52 with MI   • Stroke Maternal Grandfather    • Other Other         5 siblings, all have HTN   • No Known Problems Son    • No Known Problems Son    • Diabetes Son    •  "Hypertension Son        Review of Systems   Constitutional: Positive for weight gain. Negative for decreased appetite, diaphoresis and malaise/fatigue (improving).   HENT: Negative for nosebleeds.    Eyes: Negative for blurred vision.   Cardiovascular: Positive for leg swelling (mild). Negative for chest pain, claudication, cyanosis, dyspnea on exertion, irregular heartbeat, near-syncope, orthopnea, palpitations, paroxysmal nocturnal dyspnea and syncope.   Respiratory: Positive for cough and shortness of breath. Negative for snoring and sputum production.    Endocrine: Negative for cold intolerance and heat intolerance.   Hematologic/Lymphatic: Does not bruise/bleed easily.   Skin: Negative for rash.   Musculoskeletal: Negative for falls and myalgias.   Gastrointestinal: Negative for heartburn, melena and nausea.   Genitourinary: Negative for dysuria and hematuria.   Neurological: Negative for dizziness and light-headedness.   Psychiatric/Behavioral: The patient does not have insomnia and is not nervous/anxious.         BP Readings from Last 5 Encounters:   07/27/21 126/70   05/04/21 132/74   04/27/21 154/90       Wt Readings from Last 5 Encounters:   07/27/21 113 kg (249 lb)   04/27/21 109 kg (241 lb)       Objective      Covid antibodies were positive on lab report labs 4/29/2021: Total cholesterol 176, triglycerides 200, HDL 49, LDL 93, glucose 94, BUN 10, creatinine 0.75, sodium 142, potassium 4.5, chloride 107, CO2 23, calcium 9.6, total protein 7.1, albumin 3.94, ALT 18, AST 20, ALP 97, total bili 0.3, GFR 78, CK 26 folate greater than 20, vitamin D 8.1, vitamin B12 458, WBC 5.28, RBC 3.86, hemoglobin 10.9, hematocrit 37, platelets 230, Covid antibodies positive    /70 (BP Location: Left arm)   Pulse 64   Ht 170.2 cm (67\")   Wt 113 kg (249 lb)   BMI 39.00 kg/m²     Vitals and nursing note reviewed.   Eyes:      Pupils: Pupils are equal, round, and reactive to light.   HENT:      Head: Normocephalic. "   Neck:      Vascular: No carotid bruit or JVD.   Pulmonary:      Effort: Pulmonary effort is normal.      Breath sounds: Normal breath sounds. No wheezing. No rales.      Comments: Supplemental oxygen per NC in use  Cardiovascular:      Normal rate. Regular rhythm.   Pulses:     Intact distal pulses.   Edema:     Pretibial: bilateral trace edema of the pretibial area.     Ankle: bilateral 1+ edema of the ankle.     Feet: bilateral 1+ edema of the feet.  Abdominal:      General: Bowel sounds are normal.      Palpations: Abdomen is soft.   Musculoskeletal: Normal range of motion.      Cervical back: Normal range of motion. Skin:     General: Skin is warm.   Neurological:      Mental Status: Alert and oriented to person, place, and time.          Procedures: none today          Assessment/Plan   Diagnoses and all orders for this visit:    1. Shortness of breath (Primary)    2. COREY treated with BiPAP    3. Generalized edema  -     furosemide (LASIX) 20 MG tablet; 1/2 to 1 tablet once a day if needed for swelling  Dispense: 30 tablet; Refill: 3    4. Chronic fatigue    5. Metabolic syndrome    6. Vitamin D deficiency      The reports of her recent stress test and echocardiogram were reviewed.  No ischemia was noted.  Echo showed normal LVEF and no significant valvular issues.  Overall her symptoms are gradually improving since Covid illness.    Her blood pressure is normal today.  She is on minoxidil secondary to hair loss.  To maintain normal blood pressures on midodrine.  She admits to mild generalized swelling for which a low-dose diuretic prescribed to use only if needed.  She understands to monitor blood pressure closely when takes diuretic.    Patient's Body mass index is 39 kg/m². indicating that she is obese (BMI >30). Obesity-related health conditions include the following: obstructive sleep apnea. Obesity is worsening. BMI is is above average; BMI management plan is completed. We discussed exercise, portion  control and possible medication management.  Diet for weight loss informational handout and benefit of exercise in patients with heart disease handout given to her.  Regarding medication management I advised her to further discuss with you at follow-up visit in December if she continues to have issues with weight loss.     Currently patient appears stable from a cardiac standpoint.  No further testing ordered at this time.  We will see her back in 6 months.  Please call sooner if cardiac concerns.           Electronically signed by ROSAS Newton,  July 27, 2021 15:23 EDT

## 2022-02-09 ENCOUNTER — OFFICE VISIT (OUTPATIENT)
Dept: CARDIOLOGY | Facility: CLINIC | Age: 62
End: 2022-02-09

## 2022-02-09 VITALS
HEART RATE: 66 BPM | BODY MASS INDEX: 42.97 KG/M2 | DIASTOLIC BLOOD PRESSURE: 68 MMHG | HEIGHT: 67 IN | TEMPERATURE: 97.4 F | SYSTOLIC BLOOD PRESSURE: 124 MMHG | WEIGHT: 273.8 LBS

## 2022-02-09 DIAGNOSIS — U09.9 POST-COVID SYNDROME: Primary | ICD-10-CM

## 2022-02-09 DIAGNOSIS — R53.82 CHRONIC FATIGUE: ICD-10-CM

## 2022-02-09 DIAGNOSIS — R06.02 SHORTNESS OF BREATH: ICD-10-CM

## 2022-02-09 DIAGNOSIS — E78.00 HYPERCHOLESTEREMIA: ICD-10-CM

## 2022-02-09 DIAGNOSIS — G47.30 SLEEP APNEA TREATED WITH NOCTURNAL BIPAP: ICD-10-CM

## 2022-02-09 PROBLEM — I25.6 SILENT MYOCARDIAL ISCHEMIA: Status: RESOLVED | Noted: 2021-04-27 | Resolved: 2022-02-09

## 2022-02-09 PROCEDURE — 99213 OFFICE O/P EST LOW 20 MIN: CPT | Performed by: NURSE PRACTITIONER

## 2022-02-09 NOTE — PATIENT INSTRUCTIONS
Physical Activity With Heart Disease  Being active has many benefits, especially if you have heart disease. Physical activity can help you do more and feel healthier. Start slowly, and increase the amount of time you spend being active. Most adults should aim for physical activity that:  · Makes you breathe harder and raises your heart rate (aerobic activity). Try to get at least 150 minutes of aerobic activity each week. This is about 30 minutes each day, 5 days a week.  · Helps build muscle strength (strengthening activity). Do this at least 2 times a week.  Always talk with your health care provider before starting any new activity program or if you have any changes in your condition.  What are the benefits of physical activity?  When you have heart disease, physical activity can help:  · Lower your blood pressure.  · Lower your cholesterol.  · Control your weight.  · Improve your sleep.  · Help control your blood sugar.  · Improve your heart and lung function.  · Reduce your risk for blood clots (thrombophlebitis).  · Improve your energy level.  · Reduce stress.  What are some types of physical activity I could try?  There are many ways to be active. Talk with your health care provider about what types and intensity of activity is right for you.  Aerobic activity    Aerobic (cardiovascular) activity can be moderate or vigorous intensity, depending on how hard you are working.  Moderate-intensity activity includes:  · Walking.  · Slow bicycling.  · Water aerobics.  · Dancing.  · Light gardening or house work.  Vigorous-intensity activity includes:  · Jogging or running.  · Stair climbing.  · Swimming laps.  · Hiking uphill.  · Heavy gardening, such as digging trenches.    Strengthening activity  Strengthening activities work your muscles to build strength. Some examples include:  · Doing push-ups, sit-ups, or pull-ups.  · Lifting small weights.  · Using resistance bands.    Flexibility  Flexibility activities  lengthen your muscles to keep them flexible and less tight and improve your balance. Some examples include:  · Stretching.  · Yoga.  · [place] chi.  · Ballet barre.    Follow these instructions at home:  How to get started  · Talk with your health care provider about:  ? What types of activities are safe for you.  ? If you should check your pulse or take other precautions during physical activity.  · Get a calendar. Write down a schedule and plan for your new routine.  · Take time to find out what works for you. Consider:  ? Joining a community program, such as a biking group, yoga class, local gym, or swimming pool membership.  ? Be active on your own by downloading free workout applications on a smartphone or other devices, or by purchasing workout DVDs.  · If you have not been active, begin with sessions that last 10-15 minutes. Gradually work up to sessions that last 20-30 minutes, 5 times a week. Follow all of your health care provider's recommendations.  · Be patient with yourself. It takes time to build up strength and lung capacity.  Safety  · Exercise in an indoor, climate-controlled facility, as told by your health care provider. You may need to do this if:  ? There are extreme outdoor conditions, such as heat, humidity, or cold.  ? There is an air pollution advisory. Your local news, board of health, or hospital can provide information on air quality.  · Take extra precautions as told by your health care provider. This may include:  ? Monitoring your heart rate.  ? Avoiding heavy lifting.  ? Understanding how your medicines can affect you during physical activity. Certain medicines may cause heat intolerance or changes in blood sugar.  ? Slowing down to rest when you need to.  ? Keeping nitroglycerin spray and tablets with you at all times if you have angina. Use them as told to prevent and treat symptoms.  · Drink plenty of water before, during, and after physical activity.  · Know what symptoms may be signs  of a problem. Stop physical activity right away if you have any of these symptoms.  Get help right away if you have any of the following during exercise:  · Chest pain, shortness of breath, or feel very tired.  · Pain in the arm, shoulder, neck, or jaw.  · Feel weak, dizzy, or light-headed.  · An irregular heart rate, or your heart rate is greater than 100 beats per minute (bpm) before exercise.  These symptoms may represent a serious problem that is an emergency. Do not wait to see if the symptoms go away. Get medical help right away. Call your local emergency services (911 in the [place] Do not drive yourself to the hospital.   Summary  · Physical activity has many benefits, especially if you have heart disease.  · Before starting an activity program, talk with your health care provider about how often to be active and what type of activity is safe for you.  · Your physical activity plan may include moderate or vigorous aerobic activity, strengthening activities, and flexibility.  · Know what symptoms may be signs of a problem. Stop physical activity right away and call emergency services (911 in the [place] if you have any of these symptoms.  This information is not intended to replace advice given to you by your health care provider. Make sure you discuss any questions you have with your health care provider.  Document Revised: 01/09/2019 Document Reviewed: 01/09/2019  Elsevier Patient Education © 2021 [employer]

## 2022-02-09 NOTE — PROGRESS NOTES
Chief Complaint   Patient presents with   • Follow-up     Cardiac management. Has no cardiac complaints today   • Shortness of Breath     Has SOA and cough, she has recently had Bronchitis   • Med Refill     No refills needed today. Had med list today       Subjective       Yulissa Couch is a 61 y.o. female seen in April 2021 for initial cardiac evaluation.  No prior cardiac history noted.  After Covid she required intubation.  She was on ventilator for prolonged period of time and after extubation required reintubation due to desaturation.  During her hospital stay she had mild elevation of troponin suggestive of non-STEMI.  Echocardiogram was reported as normal.  After discharge she had problems with hypotension and prescribed ProAmatine.  Shortness of breath persisted and supplemental oxygen 24/7 prescribed, followed by pulmonologist.On 5/24/2021 she underwent Lexiscan stress test and echocardiogram.  No ischemia was noted.  Echocardiogram showed LVEF of 61 to 65% and no significant valvular issues.  RVSP was 19 mmHg.  She then underwent sleep study and diagnosed with apnea, BiPAP prescribed.    Today she returns to the office for a follow-up visit.  She no longer requires supplemental oxygen but continues to use BiPAP at night.  No recent chest pain, palpitations, or edema noted.  She did develop congested cough and diagnosed with bronchitis and symptoms have improved since last week.  Since last visit she was able to wean off midodrine and blood pressures been stable.  At home she is walking for exercise.  Due to knee issues she has not been using elliptical.    Cardiac History:    Past Surgical History:   Procedure Laterality Date   • ECHO - CONVERTED  01/25/2021    @ [facility]- [facility]. EF 67%. Mild MR   • ECHO - CONVERTED  01/11/2021    @ [facility]. TLS. EF 66^. Trace MR       Current Outpatient Medications   Medication Sig Dispense Refill   • albuterol sulfate  (90 Base) MCG/ACT inhaler Inhale 2 puffs Every 4 (Four)  Hours As Needed for Wheezing.     • aspirin 81 MG EC tablet Take 81 mg by mouth Daily.     • atorvastatin (LIPITOR) 80 MG tablet Take 40 mg by mouth Daily.     • folic acid (FOLVITE) 400 MCG tablet Take 400 mcg by mouth Daily.     • furosemide (LASIX) 20 MG tablet 1/2 to 1 tablet once a day if needed for swelling 30 tablet 3   • loratadine (CLARITIN) 10 MG tablet Take 10 mg by mouth Daily.     • minoxidil (LONITEN) 2.5 MG tablet Take 2.5 mg by mouth 3 (Three) Times a Day.     • O2 (OXYGEN) Inhale 2 L/min 1 (One) Time.     • vitamin B-12 (CYANOCOBALAMIN) 250 MCG tablet Take 250 mcg by mouth Daily.     • vitamin D (ERGOCALCIFEROL) 1.25 MG (09047 UT) capsule capsule Take 1 capsule by mouth 1 (One) Time Per Week. 5 capsule 11     No current facility-administered medications for this visit.       Morphine, Penicillins, and Benadryl [diphenhydramine]    Past Medical History:   Diagnosis Date   • Anemia    • Asthma    • History of hysterectomy    • Hyperlipidemia    • Hypotension    • Seasonal allergies    • Sleep apnea        Social History     Socioeconomic History   • Marital status:    Tobacco Use   • Smoking status: Former Smoker     Packs/day: 1.00     Years: 30.00     Pack years: 30.00     Types: Cigarettes     Quit date:      Years since quittin.1   • Smokeless tobacco: Never Used   Vaping Use   • Vaping Use: Never used   Substance and Sexual Activity   • Alcohol use: Never   • Drug use: Never       Family History   Problem Relation Age of Onset   • Stroke Mother          at 49 with stroke   • Heart attack Father          at 52 with MI   • Stroke Maternal Grandfather    • Other Other         5 siblings, all have HTN   • No Known Problems Son    • No Known Problems Son    • Diabetes Son    • Hypertension Son        Review of Systems   Constitutional: Positive for weight gain. Negative for decreased appetite, diaphoresis and malaise/fatigue.   HENT: Negative for nosebleeds.    Eyes: Negative  "for blurred vision.   Cardiovascular: Negative for chest pain, claudication, cyanosis, dyspnea on exertion, irregular heartbeat, leg swelling, near-syncope, orthopnea, palpitations, paroxysmal nocturnal dyspnea and syncope.   Respiratory: Positive for cough and sleep disturbances due to breathing. Negative for shortness of breath, snoring and sputum production.    Endocrine: Negative for cold intolerance and heat intolerance.   Hematologic/Lymphatic: Does not bruise/bleed easily.   Skin: Negative for rash.   Musculoskeletal: Positive for muscle weakness (legs, improving). Negative for falls and myalgias.   Gastrointestinal: Negative for heartburn, melena and nausea.   Genitourinary: Negative for dysuria and hematuria.   Neurological: Positive for weakness (improving). Negative for dizziness and light-headedness.   Psychiatric/Behavioral: The patient does not have insomnia and is not nervous/anxious.         BP Readings from Last 5 Encounters:   02/09/22 124/68   07/27/21 126/70   05/04/21 132/74   04/27/21 154/90       Wt Readings from Last 5 Encounters:   02/09/22 124 kg (273 lb 12.8 oz)   07/27/21 113 kg (249 lb)   04/27/21 109 kg (241 lb)       Objective      labs 4/29/2021: Total cholesterol 176, triglycerides 200, HDL 49, LDL 93, glucose 94, BUN 10, creatinine 0.75, sodium 142, potassium 4.5, chloride 107, CO2 23, calcium 9.6, total protein 7.1, albumin 3.94, ALT 18, AST 20, ALP 97, total bili 0.3, GFR 78, CK 26 folate greater than 20, vitamin D 8.1, vitamin B12 458, WBC 5.28, RBC 3.86, hemoglobin 10.9, hematocrit 37, platelets 230, positive for Covid antibodies    /68 (BP Location: Left arm)   Pulse 66   Temp 97.4 °F (36.3 °C)   Ht 170.2 cm (67\")   Wt 124 kg (273 lb 12.8 oz)   BMI 42.88 kg/m²     Vitals and nursing note reviewed.   Eyes:      Pupils: Pupils are equal, round, and reactive to light.   HENT:      Head: Normocephalic.   Neck:      Vascular: No carotid bruit or JVD.   Pulmonary:      " Effort: Pulmonary effort is normal.      Breath sounds: Normal breath sounds.   Cardiovascular:      Normal rate. Regular rhythm.   Edema:     Peripheral edema absent.   Abdominal:      General: Bowel sounds are normal.      Palpations: Abdomen is soft.   Musculoskeletal: Normal range of motion.      Cervical back: Normal range of motion. Skin:     General: Skin is warm.   Neurological:      Mental Status: Alert and oriented to person, place, and time.          Procedures: none today          Assessment/Plan   Diagnoses and all orders for this visit:    1. Post-COVID syndrome (Primary)    2. Shortness of breath    3. Chronic fatigue    4. Sleep apnea treated with nocturnal BiPAP    5. Hypercholesteremia      Post Covid-shortness of breath has improved.  She continues to have some generalized weakness but admits this is slowly improving and she continues low impact exercise daily.  Cardiac symptoms denied.  She has not had hypotension therefore midodrine discontinued.  Continue to monitor.    Sleep apnea-compliant with BiPAP.    Hypercholesterolemia-on Lipitor 40 mg daily.  She will follow with you for lab orders/management.  She continues to have some leg weakness but denies myalgia.  Consider CK level with next labs.    Obesity-Patient's Body mass index is 42.88 kg/m². indicating that she is morbidly obese (BMI > 40 or > 35 with obesity - related health condition). Obesity-related health conditions include the following: obstructive sleep apnea and dyslipidemias. Obesity is worsening. BMI is is above average; BMI management plan is completed. We discussed portion control and increasing exercise.  Patient did receive steroid therapy during Covid which most likely contributed to her weight gain.  I encouraged her weight loss efforts by diet and exercise.    From a cardiac standpoint patient appears stable.  A 6-month follow-up visit scheduled.  Please call sooner for cardiac concerns.           Electronically signed  by Samantha Lerner, APRN,  February 9, 2022 09:38 EST

## 2022-05-31 RX ORDER — ERGOCALCIFEROL 1.25 MG/1
50000 CAPSULE ORAL WEEKLY
Qty: 5 CAPSULE | Refills: 11 | Status: SHIPPED | OUTPATIENT
Start: 2022-05-31

## 2022-09-20 ENCOUNTER — OFFICE VISIT (OUTPATIENT)
Dept: CARDIOLOGY | Facility: CLINIC | Age: 62
End: 2022-09-20

## 2022-09-20 VITALS
SYSTOLIC BLOOD PRESSURE: 138 MMHG | WEIGHT: 274.2 LBS | HEART RATE: 68 BPM | HEIGHT: 67 IN | DIASTOLIC BLOOD PRESSURE: 72 MMHG | BODY MASS INDEX: 43.03 KG/M2

## 2022-09-20 DIAGNOSIS — R60.1 GENERALIZED EDEMA: ICD-10-CM

## 2022-09-20 DIAGNOSIS — U09.9 POST-COVID SYNDROME: Primary | ICD-10-CM

## 2022-09-20 DIAGNOSIS — E66.2 CLASS 3 OBESITY WITH ALVEOLAR HYPOVENTILATION, SERIOUS COMORBIDITY, AND BODY MASS INDEX (BMI) OF 40.0 TO 44.9 IN ADULT: ICD-10-CM

## 2022-09-20 DIAGNOSIS — G47.30 SLEEP APNEA TREATED WITH NOCTURNAL BIPAP: ICD-10-CM

## 2022-09-20 DIAGNOSIS — E78.00 HYPERCHOLESTEREMIA: ICD-10-CM

## 2022-09-20 PROCEDURE — 99213 OFFICE O/P EST LOW 20 MIN: CPT | Performed by: NURSE PRACTITIONER

## 2022-09-20 RX ORDER — FUROSEMIDE 20 MG/1
TABLET ORAL
Qty: 90 TABLET | Refills: 1 | Status: SHIPPED | OUTPATIENT
Start: 2022-09-20

## 2022-09-20 RX ORDER — PHENTERMINE HYDROCHLORIDE 37.5 MG/1
37.5 TABLET ORAL
COMMUNITY

## 2022-09-20 RX ORDER — BENZALKONIUM CHLORIDE 1.3 MG/ML
CLOTH TOPICAL
COMMUNITY

## 2022-09-20 RX ORDER — ATORVASTATIN CALCIUM 80 MG/1
40 TABLET, FILM COATED ORAL DAILY
Qty: 90 TABLET | Refills: 3 | Status: SHIPPED | OUTPATIENT
Start: 2022-09-20 | End: 2023-03-20

## 2022-09-20 NOTE — PROGRESS NOTES
Chief Complaint   Patient presents with   • Follow-up     Cardiac management .Patient has no cardiac complaints . She has been trying a weight loss regimen with diet and exercise and weight loss tablets   • LABS     Had labs per PCP. Will call for results.   • Med Refill     Needs refills on Lasix and Lipitor 90 day supply to [facility] pharmacy       Subjective       Yulissa Couch is a 61 y.o. female  seen in April 2021 for initial cardiac evaluation.  No prior cardiac history noted.  After Covid she required intubation.  She was on ventilator for prolonged period of time and after extubation required reintubation due to desaturation.  During her hospital stay she had mild elevation of troponin suggestive of non-STEMI.  Echocardiogram was reported as normal.  After discharge she had problems with hypotension and prescribed ProAmatine.  Shortness of breath persisted and supplemental oxygen 24/7 prescribed, followed by pulmonologist. On 5/24/2021 she underwent Lexiscan stress test and echocardiogram.  No ischemia was noted.  Echocardiogram showed LVEF of 61 to 65% and no significant valvular issues.  RVSP was 19 mmHg.  She then underwent sleep study and diagnosed with apnea, BiPAP prescribed.  Later she was able to wean off midodrine.    Today she returns the office for a follow-up visit. A month ago she started routine exercise and diet along with adding Adipex.  Currently she is able to use the elliptical for brief periods of time, having to stop due to developing dyspnea.  From a cardiac standpoint she denies chest pain or palpitations.  Her weight is down about 6 pounds.  Blood pressures remained stable.     Cardiac History:    Past Surgical History:   Procedure Laterality Date   • ECHO - CONVERTED  01/25/2021    @ [facility]- [facility]. EF 67%. Mild MR   • ECHO - CONVERTED  01/11/2021    @ [facility]. TLS. EF 66^. Trace MR       Current Outpatient Medications   Medication Sig Dispense Refill   • albuterol sulfate  (90 Base)  MCG/ACT inhaler Inhale 2 puffs Every 4 (Four) Hours As Needed for Wheezing.     • aspirin 81 MG EC tablet Take 81 mg by mouth Daily.     • atorvastatin (LIPITOR) 80 MG tablet Take 0.5 tablets by mouth Daily. 90 tablet 3   • folic acid (FOLVITE) 400 MCG tablet Take 400 mcg by mouth Daily.     • furosemide (LASIX) 20 MG tablet 1/2 to 1 tablet once a day if needed for swelling 90 tablet 1   • loratadine (CLARITIN) 10 MG tablet Take 10 mg by mouth Daily.     • phentermine (ADIPEX-P) 37.5 MG tablet Take 37.5 mg by mouth Every Morning Before Breakfast.     • Respiratory Therapy Supplies (CareTouch CPAP & BIPAP Hose) misc      • vitamin B-12 (CYANOCOBALAMIN) 250 MCG tablet Take 250 mcg by mouth Daily.     • vitamin D (ERGOCALCIFEROL) 1.25 MG (27825 UT) capsule capsule Take 1 capsule by mouth 1 (One) Time Per Week. 5 capsule 11     No current facility-administered medications for this visit.       Morphine, Penicillins, and Benadryl [diphenhydramine]    Past Medical History:   Diagnosis Date   • Anemia    • Asthma    • History of hysterectomy    • Hyperlipidemia    • Hypotension    • Seasonal allergies    • Sleep apnea        Social History     Socioeconomic History   • Marital status:    Tobacco Use   • Smoking status: Former Smoker     Packs/day: 1.00     Years: 30.00     Pack years: 30.00     Types: Cigarettes     Quit date: 2011     Years since quittin.7   • Smokeless tobacco: Never Used   Vaping Use   • Vaping Use: Never used   Substance and Sexual Activity   • Alcohol use: Never   • Drug use: Never   • Sexual activity: Yes     Partners: Male     Birth control/protection: Other     Comment: 2000 hysterectomy       Family History   Problem Relation Age of Onset   • Stroke Mother          at 49 with stroke   • Heart attack Father          at 52 with MI   • Stroke Maternal Grandfather    • Other Other         5 siblings, all have HTN   • No Known Problems Son    • No Known Problems Son    •  "Diabetes Son    • Hypertension Son        Review of Systems   Constitutional: Positive for weight loss (intentional). Negative for decreased appetite, diaphoresis and malaise/fatigue.   HENT: Negative for nosebleeds.    Eyes: Negative for blurred vision.   Cardiovascular: Positive for dyspnea on exertion. Negative for chest pain, claudication, cyanosis, irregular heartbeat, leg swelling, near-syncope, orthopnea, palpitations, paroxysmal nocturnal dyspnea and syncope.   Respiratory: Positive for shortness of breath and sleep disturbances due to breathing (uses BiPAP with benefit).    Endocrine: Negative for cold intolerance and heat intolerance.   Hematologic/Lymphatic: Does not bruise/bleed easily.   Skin: Negative for rash.   Musculoskeletal: Negative for myalgias.   Gastrointestinal: Negative for heartburn, melena and nausea.   Genitourinary: Negative for dysuria and hematuria.   Neurological: Negative for dizziness and light-headedness.   Psychiatric/Behavioral: The patient does not have insomnia and is not nervous/anxious.         BP Readings from Last 5 Encounters:   09/20/22 138/72   02/09/22 124/68   07/27/21 126/70   05/04/21 132/74   04/27/21 154/90       Wt Readings from Last 5 Encounters:   09/20/22 124 kg (274 lb 3.2 oz)   02/09/22 124 kg (273 lb 12.8 oz)   07/27/21 113 kg (249 lb)   04/27/21 109 kg (241 lb)       Objective      labs 4/29/2021: Total cholesterol 176, triglycerides 200, HDL 49, LDL 93, glucose 94, BUN 10, creatinine 0.75, sodium 142, potassium 4.5, chloride 107, CO2 23, calcium 9.6, total protein 7.1, albumin 3.94, ALT 18, AST 20, ALP 97, total bili 0.3, GFR 78, CK 26 folate greater than 20, vitamin D 8.1, vitamin B12 458, WBC 5.28, RBC 3.86, hemoglobin 10.9, hematocrit 37, platelets 230, positive for Covid antibodies    /72 (BP Location: Right arm)   Pulse 68   Ht 170.2 cm (67\")   Wt 124 kg (274 lb 3.2 oz)   BMI 42.95 kg/m²     Vitals and nursing note reviewed.   Eyes:      " Pupils: Pupils are equal, round, and reactive to light.   HENT:      Head: Normocephalic.   Neck:      Vascular: No carotid bruit.   Pulmonary:      Breath sounds: Normal breath sounds.   Cardiovascular:      Normal rate. Regular rhythm.   Pulses:     Intact distal pulses.   Edema:     Peripheral edema absent.   Abdominal:      General: Bowel sounds are normal.      Palpations: Abdomen is soft.   Musculoskeletal: Normal range of motion.      Cervical back: Normal range of motion. Skin:     General: Skin is warm.   Neurological:      Mental Status: Alert and oriented to person, place, and time.          Procedures: none today          Assessment & Plan   Diagnoses and all orders for this visit:    1. Post-COVID syndrome (Primary)    2. Sleep apnea treated with nocturnal BiPAP    3. Hypercholesteremia  -     atorvastatin (LIPITOR) 80 MG tablet; Take 0.5 tablets by mouth Daily.  Dispense: 90 tablet; Refill: 3    4. Class 3 obesity with alveolar hypoventilation, serious comorbidity, and body mass index (BMI) of 40.0 to 44.9 in adult (HCC)    5. Generalized edema  -     furosemide (LASIX) 20 MG tablet; 1/2 to 1 tablet once a day if needed for swelling  Dispense: 90 tablet; Refill: 1      Patient presents today without cardiac complaints or concerns.  Her blood pressure, heart rate and rhythm are normal.  No repeat cardiac testing warranted at this time.    For lipid management she is on statin therapy in form of Lipitor without side effects noted.  PCP to monitor labs.  Please forward copy of next lab results.    Patient is compliant with BiPAP therapy and finds beneficial.  She no longer has to use supplemental oxygen during the day.    Her weight is down 6 pounds from prior visit.  She reports recent exercise and diet regiment along with Adipex therapy.  I encouraged her efforts for lifestyle modification and weight reduction.    In regards to edema she admits overall improved and only takes diuretic once or twice a  month.  Refills given to continue Lasix on as-needed basis.    From a cardiac standpoint patient appears stable.  We will see her back in 1 year.  Please call sooner for chronic concerns.

## 2023-02-23 ENCOUNTER — TELEPHONE (OUTPATIENT)
Dept: CARDIOLOGY | Facility: CLINIC | Age: 63
End: 2023-02-23
Payer: MEDICAID

## 2023-02-23 NOTE — TELEPHONE ENCOUNTER
Received fax from Dr. Lebron for cardiac clearance for patient to have a lap sleeve gastrectomy. Patient is on aspirin, unclear if needing to hold. According to our records, I do not see where patient has had any stenting.        Fax [phone]

## 2023-03-17 ENCOUNTER — TELEPHONE (OUTPATIENT)
Dept: CARDIOLOGY | Facility: CLINIC | Age: 63
End: 2023-03-17
Payer: MEDICAID

## 2023-03-20 RX ORDER — ATORVASTATIN CALCIUM 40 MG/1
40 TABLET, FILM COATED ORAL DAILY
Qty: 30 TABLET | Refills: 6 | Status: SHIPPED | OUTPATIENT
Start: 2023-03-20

## 2023-04-24 RX ORDER — ERGOCALCIFEROL 1.25 MG/1
50000 CAPSULE ORAL WEEKLY
Qty: 5 CAPSULE | Refills: 11 | Status: SHIPPED | OUTPATIENT
Start: 2023-04-24

## 2023-09-25 NOTE — PROGRESS NOTES
Chief Complaint   Patient presents with    Follow-up     Cardiac management. Has no cardiac  complaints today.  Had Gastric sleeve in March 2023  with success .    LABS     No current labs, she asked if needs Vitamin d supplement  along with Calcium with Vitamin D    Med Refill     No refills needed today        Subjective       Yulissa Couch is a 62 y.o. female seen in April 2021 for initial cardiac evaluation.  After Covid she required intubation.  She was on ventilator for prolonged period of time and after extubation required reintubation due to desaturation.  During her hospital stay she had mild elevation of troponin suggestive of non-STEMI.  Echocardiogram was reported as normal.  After discharge she had problems with hypotension and prescribed ProAmatine.  Shortness of breath persisted and supplemental oxygen 24/7 prescribed, followed by pulmonologist. On 5/24/2021 she underwent Lexiscan stress test and echocardiogram.  No ischemia was noted.  Echocardiogram showed LVEF of 61 to 65% and no significant valvular issues.  RVSP was 19 mmHg.  She then underwent sleep study and diagnosed with apnea, BiPAP prescribed.  Later she was able to wean off midodrine.     Today she returns to the office for a follow up visit. In March 2023 she underwent gastric sleeve and has lost approximately 70 pounds.  Shortness of breath has improved.  She denies cardiac symptoms or concerns.  This summer she has maintained yard work, pool maintenance, and normal daily activity without issue.  At night she uses BiPAP without issue.  Medication changes are noted.  Minoxidil maintained for hair growth.  She is no longer on Lasix, aspirin, or statin.    Cardiac History:    Past Surgical History:   Procedure Laterality Date    ECHO - CONVERTED  01/25/2021    @ [facility]- TLS. EF 67%. Mild MR    ECHO - CONVERTED  01/11/2021    @ [facility]. TLS. EF 66^. Trace MR       Current Outpatient Medications   Medication Sig Dispense Refill    albuterol sulfate   (90 Base) MCG/ACT inhaler Inhale 2 puffs Every 4 (Four) Hours As Needed for Wheezing.      Digestive Aids Mixture (PAPAYA ENZYMES PO) Take 1 capsule by mouth As Needed.      minoxidil (LONITEN) 10 MG tablet Take 1 tablet by mouth Daily. 1/2 tablet daily      omeprazole (priLOSEC) 40 MG capsule Take 1 capsule by mouth Daily.      Respiratory Therapy Supplies (CareTouch CPAP & BIPAP Hose) misc       vitamin D (ERGOCALCIFEROL) 1.25 MG (90754 UT) capsule capsule Take 1 capsule by mouth 1 (One) Time Per Week. 5 capsule 11     No current facility-administered medications for this visit.       Morphine, Penicillins, and Benadryl [diphenhydramine]    Past Medical History:   Diagnosis Date    Anemia     Asthma     H/O gastric sleeve 2023    History of hysterectomy     Hyperlipidemia     Hypotension     Seasonal allergies     Sleep apnea        Social History     Socioeconomic History    Marital status:    Tobacco Use    Smoking status: Former     Packs/day: 1.00     Years: 30.00     Pack years: 30.00     Types: Cigarettes     Quit date: 2011     Years since quittin.7    Smokeless tobacco: Never   Vaping Use    Vaping Use: Never used   Substance and Sexual Activity    Alcohol use: Never    Drug use: Never    Sexual activity: Yes     Partners: Male     Birth control/protection: Other     Comment: 2000 hysterectomy       Family History   Problem Relation Age of Onset    Stroke Mother          at 49 with stroke    Heart attack Father          at 52 with MI    Stroke Maternal Grandfather     Other Other         5 siblings, all have HTN    No Known Problems Son     No Known Problems Son     Diabetes Son     Hypertension Son        Review of Systems   Constitutional: Positive for weight loss (intentional).   Cardiovascular:  Negative for chest pain, dyspnea on exertion, leg swelling, near-syncope and palpitations.   Respiratory:  Positive for sleep disturbances due to breathing (uses CPAP with  "benefit). Negative for shortness of breath (improved with weight loss).       BP Readings from Last 5 Encounters:   09/26/23 126/68   09/20/22 138/72   02/09/22 124/68   07/27/21 126/70   05/04/21 132/74       Wt Readings from Last 5 Encounters:   09/26/23 96.8 kg (213 lb 6.4 oz)   09/20/22 124 kg (274 lb 3.2 oz)   02/09/22 124 kg (273 lb 12.8 oz)   07/27/21 113 kg (249 lb)   04/27/21 109 kg (241 lb)       Objective     /68 (BP Location: Left arm, Patient Position: Sitting)   Pulse 72   Ht 170.2 cm (67\")   Wt 96.8 kg (213 lb 6.4 oz)   BMI 33.42 kg/m²     Vitals and nursing note reviewed.   Constitutional:       Appearance: Healthy appearance. Well-groomed and not in distress.   Eyes:      Conjunctiva/sclera: Conjunctivae normal.      Pupils: Pupils are equal, round, and reactive to light.   HENT:      Head: Normocephalic.   Pulmonary:      Effort: Pulmonary effort is normal.      Breath sounds: Normal breath sounds.   Cardiovascular:      PMI at left midclavicular line. Normal rate. Regular rhythm.      Murmurs: There is no murmur.   Pulses:     Intact distal pulses.   Edema:     Peripheral edema absent.   Abdominal:      General: Bowel sounds are normal.      Palpations: Abdomen is soft.   Musculoskeletal: Normal range of motion.      Cervical back: Normal range of motion and neck supple. Skin:     General: Skin is warm and dry.   Neurological:      Mental Status: Alert, oriented to person, place, and time and oriented to person, place and time.   Psychiatric:         Behavior: Behavior is cooperative.        Procedures: none today          Assessment & Plan   Diagnoses and all orders for this visit:    1. Post-COVID syndrome (Primary)    2. Class 1 obesity with alveolar hypoventilation, serious comorbidity, and body mass index (BMI) of 33.0 to 33.9 in adult    3. Sleep apnea treated with nocturnal BiPAP    4. Hypercholesteremia      Post-COVID syndrome/obesity/COREY  -Status post gastric sleeve, weight " down approximately 70 pounds  -Follows with Follows with Dr. Guido for management of pulmonary issues  -Maintains BiPAP therapy without problem  -Shortness of breath significantly improved     In regards to dietary supplement patient voiced concerned over too much vitamin D.  Advised to further discuss at follow-up visit with weight loss clinic in near future.  Recommend vitamin D level to validate supplement dose.    Hypercholesterolemia  -Currently diet managed  -Further recommendations per lab results    From a cardiac standpoint patient denies symptoms or concerns.  We will schedule annual follow-up visit.  Please call sooner for problems.

## 2023-09-26 ENCOUNTER — OFFICE VISIT (OUTPATIENT)
Dept: CARDIOLOGY | Facility: CLINIC | Age: 63
End: 2023-09-26
Payer: MEDICAID

## 2023-09-26 VITALS
HEART RATE: 72 BPM | BODY MASS INDEX: 33.49 KG/M2 | HEIGHT: 67 IN | DIASTOLIC BLOOD PRESSURE: 68 MMHG | WEIGHT: 213.4 LBS | SYSTOLIC BLOOD PRESSURE: 126 MMHG

## 2023-09-26 DIAGNOSIS — U09.9 POST-COVID SYNDROME: Primary | ICD-10-CM

## 2023-09-26 DIAGNOSIS — G47.30 SLEEP APNEA TREATED WITH NOCTURNAL BIPAP: ICD-10-CM

## 2023-09-26 DIAGNOSIS — E78.00 HYPERCHOLESTEREMIA: ICD-10-CM

## 2023-09-26 DIAGNOSIS — E66.2 CLASS 1 OBESITY WITH ALVEOLAR HYPOVENTILATION, SERIOUS COMORBIDITY, AND BODY MASS INDEX (BMI) OF 33.0 TO 33.9 IN ADULT: ICD-10-CM

## 2023-09-26 PROCEDURE — 1160F RVW MEDS BY RX/DR IN RCRD: CPT | Performed by: NURSE PRACTITIONER

## 2023-09-26 PROCEDURE — 1159F MED LIST DOCD IN RCRD: CPT | Performed by: NURSE PRACTITIONER

## 2023-09-26 PROCEDURE — 99213 OFFICE O/P EST LOW 20 MIN: CPT | Performed by: NURSE PRACTITIONER

## 2023-09-26 RX ORDER — MINOXIDIL 10 MG/1
10 TABLET ORAL DAILY
COMMUNITY

## 2023-09-26 RX ORDER — OMEPRAZOLE 40 MG/1
40 CAPSULE, DELAYED RELEASE ORAL DAILY
COMMUNITY

## 2024-01-31 ENCOUNTER — TELEPHONE (OUTPATIENT)
Dept: CARDIOLOGY | Facility: CLINIC | Age: 64
End: 2024-01-31
Payer: MEDICAID

## 2024-02-07 ENCOUNTER — PATIENT MESSAGE (OUTPATIENT)
Dept: CARDIOLOGY | Facility: CLINIC | Age: 64
End: 2024-02-07
Payer: MEDICAID

## 2024-02-07 DIAGNOSIS — I73.9 PVD (PERIPHERAL VASCULAR DISEASE) WITH CLAUDICATION: Primary | ICD-10-CM

## 2024-04-30 RX ORDER — ERGOCALCIFEROL 1.25 MG/1
50000 CAPSULE ORAL WEEKLY
Qty: 4 CAPSULE | Refills: 0 | Status: SHIPPED | OUTPATIENT
Start: 2024-04-30

## 2024-05-25 RX ORDER — ERGOCALCIFEROL 1.25 MG/1
50000 CAPSULE ORAL WEEKLY
Qty: 4 CAPSULE | Refills: 0 | Status: SHIPPED | OUTPATIENT
Start: 2024-05-25